# Patient Record
Sex: FEMALE | Race: WHITE | NOT HISPANIC OR LATINO | URBAN - METROPOLITAN AREA
[De-identification: names, ages, dates, MRNs, and addresses within clinical notes are randomized per-mention and may not be internally consistent; named-entity substitution may affect disease eponyms.]

---

## 2022-04-17 ENCOUNTER — EMERGENCY (EMERGENCY)
Facility: HOSPITAL | Age: 32
LOS: 1 days | Discharge: HOME | End: 2022-04-17
Admitting: EMERGENCY MEDICINE
Payer: COMMERCIAL

## 2022-04-17 VITALS
SYSTOLIC BLOOD PRESSURE: 131 MMHG | OXYGEN SATURATION: 97 % | WEIGHT: 149.91 LBS | HEIGHT: 69 IN | RESPIRATION RATE: 16 BRPM | TEMPERATURE: 98 F | HEART RATE: 105 BPM | DIASTOLIC BLOOD PRESSURE: 80 MMHG

## 2022-04-17 DIAGNOSIS — S71.159A: ICD-10-CM
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
## 2022-04-17 DIAGNOSIS — S71.152A OPEN BITE, LEFT THIGH, INITIAL ENCOUNTER: ICD-10-CM

## 2022-04-17 DIAGNOSIS — S31.050A OPEN BITE OF LOWER BACK AND PELVIS WITHOUT PENETRATION INTO RETROPERITONEUM, INITIAL ENCOUNTER: ICD-10-CM

## 2022-04-17 PROCEDURE — 99283 EMERGENCY DEPT VISIT LOW MDM: CPT

## 2022-04-17 RX ORDER — HYDROXYZINE HCL 10 MG
25 TABLET ORAL ONCE
Refills: 0 | Status: COMPLETED | OUTPATIENT
Start: 2022-04-17 | End: 2022-04-17

## 2022-04-17 RX ORDER — HYDROXYZINE HCL 10 MG
1 TABLET ORAL
Qty: 28 | Refills: 0
Start: 2022-04-17 | End: 2022-04-23

## 2022-04-17 RX ADMIN — Medication 25 MILLIGRAM(S): at 23:02

## 2022-04-17 RX ADMIN — Medication 100 MILLIGRAM(S): at 23:02

## 2022-04-17 NOTE — ED PROVIDER NOTE - NS ED ROS FT
Review of Systems    Constitutional: (-) fever  Musculoskeletal: (-) neck pain, (-) back pain, (-) joint pain  Integumentary: (-) rash, (-) edema  Allergic/Immunologic: (-) pruritus

## 2022-04-17 NOTE — ED PROVIDER NOTE - PHYSICAL EXAMINATION
Physical Exam    Vital Signs: I have reviewed the initial vital signs.  Constitutional: well-nourished, appears stated age, no acute distress  Integumentary: +multiple insect bites with some surrounding erythema and induration

## 2022-04-17 NOTE — ED PROVIDER NOTE - NSFOLLOWUPINSTRUCTIONS_ED_ALL_ED_FT
Insect Bite, Adult    An insect bite can make your skin red, itchy, and swollen. An insect bite is different from an insect sting, which happens when an insect injects poison (venom) into the skin.    Some insects can spread disease to people through a bite. However, most insect bites do not lead to disease and are not serious.    What are the causes?  Insects may bite for a variety of reasons, including:    Hunger.  To defend themselves.    Insects that bite include:    Spiders.  Mosquitoes.  Ticks.  Fleas.  Ants.  Flies.  Bedbugs.    What are the signs or symptoms?  Symptoms of this condition include:    Itching or pain in the bite area.  Redness and swelling in the bite area.  An open wound (skin ulcer).    In many cases, symptoms last for 2–4 days.    How is this diagnosed?  This condition is usually diagnosed based on symptoms and a physical exam.    How is this treated?  Treatment is usually not needed. Symptoms often go away on their own. When treatment is recommended, it may involve:    Applying a cream or lotion to the bitten area. This treatment helps with itching.  Taking an antibiotic medicine. This treatment is needed if the bite area gets infected.  Getting a tetanus shot.  Applying ice to the affected area.  Medicines called antihistamines. This treatment is needed if you develop an allergic reaction to the insect bite.    Follow these instructions at home:  Bite area care     Do not scratch the bite area.  Keep the bite area clean and dry. Wash it every day with soap and water as told by your health care provider.  Check the bite area every day for signs of infection. Check for:    More redness, swelling, or pain.  Fluid or blood.  Warmth.  Pus.    Managing pain, itching, and swelling       You may apply a baking soda paste, cortisone cream, or calamine lotion to the bite area as told by your health care provider.  If directed, apply ice to the bite area.    Put ice in a plastic bag.  Place a towel between your skin and the bag.  Leave the ice on for 20 minutes, 2–3 times per day.    Medicines     Apply or take over-the-counter and prescription medicines only as told by your health care provider.  If you were prescribed an antibiotic medicine, use it as told by your health care provider. Do not stop using the antibiotic even if your condition improves.  General instructions     Keep all follow-up visits as told by your health care provider. This is important.  How is this prevented?  To help reduce your risk of insect bites:    When you are outdoors, wear clothing that covers your arms and legs.  Use insect repellent. The best insect repellents contain:    DEET, picaridin, oil of lemon eucalyptus (OLE), or RD8849.  Higher amounts of an active ingredient.    If your home windows do not have screens, consider installing them.    Contact a health care provider if:  You have more redness, swelling, or pain in the bite area.  You have fluid, blood, or pus coming from the bite area.  The bite area feels warm to the touch.  You have a fever.  Get help right away if:  You have joint pain.  You have a rash.  You have shortness of breath.  You feel unusually tired or sleepy.  You have neck pain.  You have a headache.  You have unusual weakness.  You have chest pain.  You have nausea, vomiting, or pain in the abdomen.

## 2022-04-17 NOTE — ED ADULT TRIAGE NOTE - CHIEF COMPLAINT QUOTE
flew back from [place] today had been bitten by insects all over body, now has welts, itching , nil pmhx, takes spironolactone

## 2022-04-17 NOTE — ED ADULT NURSE NOTE - OBJECTIVE STATEMENT
pt a&ox3, recently flew back from [place], c/o welts between legs, back, B/L arms by insects. denies fevers/chills. will continue to monitor.

## 2022-04-17 NOTE — ED PROVIDER NOTE - NS ED MD DISPO DISCHARGE
Spoke with patient. He is still having diplopia, similar to when he was discharged on Saturday- no better, no worse. He states possibly more difficult since increased stimulation compared to hospital setting, still more pronounced on right side. Scheduled patient for f/u visit in office 11/2 at 1 pm.    Home

## 2022-04-17 NOTE — ED PROVIDER NOTE - PATIENT PORTAL LINK FT
You can access the FollowMyHealth Patient Portal offered by [facility] by registering at the following website: [email or web address]. By joining [facility]’s FollowMyHealth portal, you will also be able to view your health information using other applications (apps) compatible with our system.

## 2022-04-17 NOTE — ED PROVIDER NOTE - OBJECTIVE STATEMENT
32 yo f returned from from [place] pw multiple bug bites along the back, UE and LE with some bug bites with erythema and induration developed over the last 3 d.    I have reviewed available current nursing and previous documentation of past medical, surgical, family, and/or social history.
